# Patient Record
Sex: MALE | NOT HISPANIC OR LATINO | URBAN - METROPOLITAN AREA
[De-identification: names, ages, dates, MRNs, and addresses within clinical notes are randomized per-mention and may not be internally consistent; named-entity substitution may affect disease eponyms.]

---

## 2017-08-14 ENCOUNTER — AMBULATORY - RIVER FALLS (OUTPATIENT)
Dept: FAMILY MEDICINE | Facility: CLINIC | Age: 27
End: 2017-08-14

## 2017-08-16 ENCOUNTER — AMBULATORY - RIVER FALLS (OUTPATIENT)
Dept: FAMILY MEDICINE | Facility: CLINIC | Age: 27
End: 2017-08-16

## 2017-08-16 ENCOUNTER — OFFICE VISIT - RIVER FALLS (OUTPATIENT)
Dept: FAMILY MEDICINE | Facility: CLINIC | Age: 27
End: 2017-08-16

## 2017-08-16 ASSESSMENT — MIFFLIN-ST. JEOR: SCORE: 1427.33

## 2017-08-21 ENCOUNTER — AMBULATORY - RIVER FALLS (OUTPATIENT)
Dept: FAMILY MEDICINE | Facility: CLINIC | Age: 27
End: 2017-08-21

## 2017-08-21 ENCOUNTER — COMMUNICATION - RIVER FALLS (OUTPATIENT)
Dept: FAMILY MEDICINE | Facility: CLINIC | Age: 27
End: 2017-08-21

## 2017-09-15 ENCOUNTER — AMBULATORY - RIVER FALLS (OUTPATIENT)
Dept: FAMILY MEDICINE | Facility: CLINIC | Age: 27
End: 2017-09-15

## 2017-09-29 ENCOUNTER — AMBULATORY - RIVER FALLS (OUTPATIENT)
Dept: FAMILY MEDICINE | Facility: CLINIC | Age: 27
End: 2017-09-29

## 2018-07-19 ENCOUNTER — OFFICE VISIT - RIVER FALLS (OUTPATIENT)
Dept: FAMILY MEDICINE | Facility: CLINIC | Age: 28
End: 2018-07-19

## 2022-02-12 VITALS
WEIGHT: 117.4 LBS | HEIGHT: 66 IN | DIASTOLIC BLOOD PRESSURE: 60 MMHG | BODY MASS INDEX: 18.87 KG/M2 | SYSTOLIC BLOOD PRESSURE: 90 MMHG | HEART RATE: 82 BPM | TEMPERATURE: 97.4 F

## 2022-02-12 VITALS
DIASTOLIC BLOOD PRESSURE: 64 MMHG | WEIGHT: 122.2 LBS | TEMPERATURE: 97.6 F | HEART RATE: 72 BPM | SYSTOLIC BLOOD PRESSURE: 94 MMHG

## 2022-02-15 NOTE — NURSING NOTE
spoke with Peg from [facility].  Pt does not need a Quant Gold to start medication.  County has medication and is ready to start tx.  Pt requesting a Quant Gold drawn.    (Occ health to contact pt and inform that lab not needed to start medication and to contact Peg to continue with treatment).    Margie Ring RN

## 2022-02-15 NOTE — PROGRESS NOTES
Patient:   SOUMYA GARIBAY            MRN: [id number]            FIN: [id number]               Age:   26 years     Sex:  Male     :  1990   Associated Diagnoses:   None   Author:   Jason Thomas MD      Visit Information      Primary Care Provider (PCP):  NONE ,       Referring Provider:  Jason Thomas MD    NPI# [id number]      Chief Complaint   2017 11:05 AM CDT   Patient presents positive TB read.        History of Present Illness   CC as above and reviewed w  patient   PPD needed for work placed 2 days ago is positive  Denies fevers, chills, night sweats, unintentional weght loss, or cough  Runs 50 miles per week and hasn't noticed any problem  Student at [employer]  Moved here from [place] 5 yrs ago.       Review of Systems            Health Status   Allergies:    Allergic Reactions (Selected)  No Known Medication Allergies      Histories   Past Medical History:    No active or resolved past medical history items have been selected or recorded.   Family History:    No family history items have been selected or recorded.   Procedure history:    No active procedure history items have been selected or recorded.   Social History:             No active social history items have been recorded.      Physical Examination   Vital Signs   2017 11:05 AM CDT Temperature Tympanic 97.4 DegF  LOW    Peripheral Pulse Rate 82 bpm    Pulse Site Radial artery    HR Method Manual    Systolic Blood Pressure 90 mmHg    Diastolic Blood Pressure 60 mmHg    Mean Arterial Pressure 70 mmHg    BP Site Right arm    BP Method Manual      Measurements from flowsheet : Measurements   2017 11:05 AM CDT Height Measured - Standard 65.5 in    Weight Measured - Standard 117.4 lb    BSA 1.57 m2    Body Mass Index 19.24 kg/m2      Apepars well  Neck - no cervical adenopathy  Lungs are clear to auscultation bilaterally      Review / Management   Results review:  Lab results: 2017 10:42 AM CDT   POC Test Comments          Pt instructed to RTC in 48-72 hours for TB read. Pt verbalizes understanding and says that he will schedule for this.  .    CXR normal await radiology interpretation      Impression and Plan   Latent TB  - cleared for work with negative CXR. Discussed he should never have PPD testing done again, only chest XR from here on out  - we discussed treament for latent TB and he seemed interested. Unsure about cost though. Wants to make sure that cost of testing and treatment would be 100% covered because he doesn't have insurance. We will look in to the cost factor  - would check quantiferon gold, HIV, liver profile, and CBC prior to initiating INH-rifampetine regimen.

## 2022-02-15 NOTE — PROGRESS NOTES
Chief Complaint    Patient is here for chest xray required by job due to positive [place]. Has had chest xrays in post due to positive results.  History of Present Illness      patient present to clinic today for positive PPD.  His employer will pay for either a TB Gold test or CXR.  he is not sure if he has had the M. bovine TB immunization.  He would like to know if he has latent TB because if so he would like treatment.  no night sweats, wtloss, hemoptysis.  He is also due for his 3rd Hep B vaccine.      Review of systems is negative except as per HPI including:  no fevers, chills, sore throat, runny nose, nausea, vomiting, constipation, diarrhea, rash or new skin lesions, chest pain, palpitations, slurred speech, new paresthesia, shortness of breath or wheeze.      Exam:      General: alert and oriented ×3 no acute distress.      HEENT: pupils are equal round and reactive to light extraocular motion is intact. Normocephalic and atraumatic.       Hearing is grossly normal and there is no otorrhea.       Nares are patent there is no rhinorrhea.       Mucous membranes are moist and pink.      Chest: has bilateral rise with no increased work of breathing.      Cardiovascular: normal perfusion and brisk capillary refill.      Musculoskeletal: no gross focal abnormalities and normal gait.      Neuro: no gross focal abnormalities and memory seems intact.      Psychiatric: speech is clear and coherent and fluent. Patient dressed appropriately for the weather. Mood is appropriate and affect is full.         Physical Exam   Vitals & Measurements    T: 97.6   F (Tympanic)  HR: 72(Peripheral)  BP: 94/64     WT: 122.2 lb   Assessment/Plan       Immunization due (Z23)         Orders:          hepatitis B adult vaccine, 1 mL, im, once, (Completed)          39220 imadm prq id subq/im njxs 1 vaccine (Charge), Quantity: 1, Immunization due          18509 hepatitis b vaccine adult dosage intramuscular (Charge), Quantity: 1,  Immunization due                Positive PPD (R76.11)         Orders:          Quantiferon(r)-tb gold* ([facility]), Specimen Type: Whole Blood, Collection Date: 07/19/18 11:27:00 CDT               25 minutes spent with patient in direct face to face contact, > 50% of time spent counseling and coordinating care.   Patient Information     Name:SOUMYA GARIBAY      Address:      [address] [place] [id number]     Sex:Male     YOB: 1990     [phone]     Emergency Contact:[facility], CONTACT     MRN:[id number]     FIN:[id number]     Location:[facility]     Date of Service:07/19/2018      Primary Care Physician:       NONE ,       Attending Physician:       Saúl Mcgee MDica, [phone]  Problem List/Past Medical History    Ongoing     No qualifying data    Historical     No qualifying data  Medications     No Recorded Medications      Allergies    No Known Medication Allergies  Social History    Smoking Status - 07/19/2018     Never smoker  Immunizations      Vaccine Date Status      hepatitis B adult vaccine 07/19/2018 Given      hepatitis B adult vaccine 09/29/2017 Given      hepatitis B adult vaccine 08/21/2017 Given